# Patient Record
Sex: FEMALE | Race: WHITE | NOT HISPANIC OR LATINO | Employment: FULL TIME | URBAN - NONMETROPOLITAN AREA
[De-identification: names, ages, dates, MRNs, and addresses within clinical notes are randomized per-mention and may not be internally consistent; named-entity substitution may affect disease eponyms.]

---

## 2017-07-07 ENCOUNTER — HISTORY (OUTPATIENT)
Dept: FAMILY MEDICINE | Facility: OTHER | Age: 42
End: 2017-07-07

## 2017-07-07 ENCOUNTER — OFFICE VISIT - GICH (OUTPATIENT)
Dept: FAMILY MEDICINE | Facility: OTHER | Age: 42
End: 2017-07-07

## 2017-07-07 DIAGNOSIS — Z83.49 FAMILY HISTORY OF OTHER ENDOCRINE, NUTRITIONAL AND METABOLIC DISEASES (CODE): ICD-10-CM

## 2017-07-07 DIAGNOSIS — Z00.00 ENCOUNTER FOR GENERAL ADULT MEDICAL EXAMINATION WITHOUT ABNORMAL FINDINGS: ICD-10-CM

## 2017-07-07 LAB — TSH - HISTORICAL: 3.61 UIU/ML (ref 0.34–5.6)

## 2017-07-13 LAB — HPV RESULTS - HISTORICAL: NEGATIVE

## 2017-12-28 NOTE — PROGRESS NOTES
Patient Information     Patient Name MRN Sex Mariama Holbrook [id number] Female 1975      Progress Notes by Kat Price MD at 2017  7:45 AM     Author:  Kat Price MD Service:  (none) Author Type:  Physician     Filed:  2017  8:51 AM Encounter Date:  2017 Status:  Signed     :  Kat Price MD (Physician)            SUBJECTIVE:    Mariama López is a 42 y.o. female who presehts for her annual exam.    HPI: Mariama López is a 42 y.o. female presents for her annual exam.  It has been nearly 8 years since her last checkup.  Would like thyroid testing due to family history.      Last pap:   Immunizations:  Tetanus is due  Mammogram at age 47  Colon cancer screening at age 50    Last Lipids:  Chol: No results found in past 5 years    .  .    PROBLEM LIST:  Patient Active Problem List     Diagnosis  Code     URINARY RETENTION R33.9     IRREGULAR MENSES N92.6     PAST MEDICAL HISTORY:  Past Medical History:     Diagnosis  Date     Hx of pregnancy     G6, , pregnancy complicated by PUPPS infection      SURGICAL HISTORY:  Past Surgical History:      Procedure  Laterality Date     DILATION AND CURETTAGE      D\T\C for miscarriage         SOCIAL HISTORY:  Social History     Social History        Marital status:       Spouse name: Maverick     Number of children:  4     Years of education:  12+     Occupational History        [employer]     Social History Main Topics       Smoking status: Never Smoker     Smokeless tobacco: Not on file     Alcohol use No     Drug use: Not on file     Sexual activity: Not on file     Other Topics  Concern     Not on file      Social History Narrative     No tobacco or alcohol     Darshana    Daughter Asya    Son Zackary    Father Matt    Mother Carmen    Daughter Delores    Son Chan date of birth              FAMILY HISTORY:  Family History       Problem    "Relation Age of Onset     No Known Problems  Father      Other  Mother      fibroids        Thyroid Disease  Mother      Hypertension  Mother      Cancer  Other      Great Uncle Lung cancer        CURRENT MEDICATIONS:   No current outpatient prescriptions on file.     No current facility-administered medications for this visit.      Medications have been reviewed by me and are current to the best of my knowledge and ability.    ALLERGIES:  Review of patient's allergies indicates no known allergies.     REVIEW OF SYSTEMS:  General: denies any general problems. No regular exercise routine  Eyes: contact lenses  Ears/Nose/Throat: denies problems, last dentist visit was every 6 months    Respiratory: denies problems  Cardiovascular: denies problems  Gastrointestinal: Episodes of heartburn, started 2 years ago when her  was laid off.  Genitourinary: periods variable in heaviness, a little crampy, still each month    Musculoskeletal: denies problems  Skin: denies problems  Neurologic: denies problems  Psychiatric: sleep not as good, both falling and staying asleep, early awakening    Endocrine: denies problems  Heme/Lymphatic: denies problems  Allergic/Immunologic: denies problems    PHQ Depression Screening 7/7/2017   Date of PHQ exam (doc flow) 7/7/2017   1. Lack of interest/pleasure 0 - Not at all   2. Feeling down/depressed 0 - Not at all   PHQ-2 TOTAL SCORE 0      OBJECTIVE:  /70  Pulse 72  Ht 1.753 m (5' 9\")  Wt 83.4 kg (183 lb 12.8 oz)  LMP 06/22/2017  BMI 27.14 kg/m2  EXAM:   General Appearance: Pleasant, alert, appropriate appearance for age. No acute distress  Head Exam: Normal. Normocephalic, atraumatic.  Eye Exam:  Normal external eye, conjunctiva, lids, cornea. RANDI.  Ear Exam: Normal TM's bilaterally. Normal auditory canals and external ears. Non-tender.  Nose Exam: Normal external nose, mucus membranes, and septum.  OroPharynx Exam:  Dental hygiene adequate. Normal buccal mucose. Normal " pharynx.  Neck Exam:  Supple, no masses or nodes.  Thyroid Exam: No nodules or enlargement.  Chest/Respiratory Exam: Normal chest wall and respirations. Clear to auscultation.  Breast Exam: No dimpling, nipple retraction or discharge. No masses or nodes.  Cardiovascular Exam: Regular rate and rhythm. S1, S2, no murmur, click, gallop, or rubs.  Gastrointestinal Exam: Soft, non-tender, no masses or organomegaly  Genitourinary Exam Female: External genitalia, vulva and vagina appear normal. Bimanual exam reveals normal uterus and adnexa, nontender urethra and bladder.  Pap and HPV testing done   Lymphatic Exam: Non-palpable nodes in neck, clavicular, axillary, or inguinal regions.  Musculoskeletal Exam: Back is straight and non-tender, full ROM of upper and lower extremities.  Foot Exam: Left and right foot: good pedal pulses, no lesions, nail hygiene good.  Skin: no rash or abnormalities  Neurologic Exam: Nonfocal, symmetric DTRs, normal gross motor, tone coordination and no tremor.  Psychiatric Exam: Alert and oriented - appropriate affect.    No results found for this visit on 07/07/17.    ASSESSMENT/PLAN    ICD-10-CM    1. Physical exam, annual Z00.00 GYN THIN PREP PAP SCREEN IMAGED   2. Family history of hypothyroidism Z83.49 TSH     Ms. Carson There is no height or weight on file to calculate BMI. This is out of the normal range for a 40 y.o. Normal range for ages 18+ is between 18.5 and 24.9. To lose weight we reviewed risks and benefits of appropriate options such as diet, exercise, and medications. Patient's strategy will be  self-directed nutrition plan and self-directed exercise program  BP Readings from Last 1 Encounters:12/15/09 : 118/80  Ms. Carson blood pressure is within the normal range for adults. Per JNC-8 guidelines normal adult blood pressure is < 120/80, pre-hypertensive is between 120/80 and 139/89, and hypertension is 140/90 or greater.    1. TSH testing to be obtained today.  2.  Pessary and HPV testing done  3. Tetanus update offered, declined today  4. Discussed mammography, will start at age 47.    Kat Price MD

## 2017-12-28 NOTE — ADDENDUM NOTE
Patient Information     Patient Name MRN Mariama Lee [id number] Female 1975      Addendum Note by Rose Forbes at 2017 12:16 PM     Author:  Rose Forbes Service:  (none) Author Type:  (none)     Filed:  2017 12:16 PM Encounter Date:  2017 Status:  Signed     :  Rose Forbes       Addended by: ROSE FORBES on: 2017 12:16 PM        Modules accepted: Orders

## 2017-12-30 NOTE — NURSING NOTE
Patient Information     Patient Name MRN Mariama Lee [id number] Female 1975      Nursing Note by Elsa Momin at 2017  7:45 AM     Author:  Elsa Momin Service:  (none) Author Type:  (none)     Filed:  2017  8:14 AM Encounter Date:  2017 Status:  Signed     :  Elsa Momin            Patient here for yearly physical. Has family history of thyroid issues, would like labs checked.  Elsa Momin LPN..............................2017  7:58 AM

## 2018-01-27 VITALS
WEIGHT: 183.8 LBS | HEART RATE: 72 BPM | DIASTOLIC BLOOD PRESSURE: 70 MMHG | BODY MASS INDEX: 27.22 KG/M2 | SYSTOLIC BLOOD PRESSURE: 110 MMHG | HEIGHT: 69 IN

## 2018-07-24 NOTE — PROGRESS NOTES
Patient Information     Patient Name  Mariama López MRN  [id number] Sex  Female   1975      Letter by Kat Crowder MD at      Author:  Kat Crowder MD Service:  (none) Author Type:  (none)    Filed:   Encounter Date:  2017 Status:  (Other)           Mariama López  [address] [facility] [address]          2017    Dear Ms. López:    Your pap smear and HPV results are normal.  Your next pap smear is due in 5 years.    Sincerely,  Kat Price MD [facility] 2017 7:50 AM

## 2019-03-31 ENCOUNTER — OFFICE VISIT (OUTPATIENT)
Dept: FAMILY MEDICINE | Facility: OTHER | Age: 44
End: 2019-03-31
Attending: PHYSICIAN ASSISTANT
Payer: COMMERCIAL

## 2019-03-31 VITALS
TEMPERATURE: 101.4 F | WEIGHT: 179.7 LBS | SYSTOLIC BLOOD PRESSURE: 124 MMHG | DIASTOLIC BLOOD PRESSURE: 84 MMHG | BODY MASS INDEX: 26.62 KG/M2 | RESPIRATION RATE: 16 BRPM | HEIGHT: 69 IN | HEART RATE: 82 BPM

## 2019-03-31 DIAGNOSIS — J02.9 SORE THROAT: ICD-10-CM

## 2019-03-31 DIAGNOSIS — J02.0 STREPTOCOCCAL SORE THROAT: Primary | ICD-10-CM

## 2019-03-31 LAB
DEPRECATED S PYO AG THROAT QL EIA: ABNORMAL
SPECIMEN SOURCE: ABNORMAL

## 2019-03-31 PROCEDURE — 87880 STREP A ASSAY W/OPTIC: CPT | Performed by: PHYSICIAN ASSISTANT

## 2019-03-31 PROCEDURE — 99214 OFFICE O/P EST MOD 30 MIN: CPT | Performed by: NURSE PRACTITIONER

## 2019-03-31 RX ORDER — AMOXICILLIN 500 MG/1
500 TABLET, FILM COATED ORAL 2 TIMES DAILY
Qty: 20 TABLET | Refills: 0 | Status: SHIPPED | OUTPATIENT
Start: 2019-03-31 | End: 2019-04-10

## 2019-03-31 ASSESSMENT — PAIN SCALES - GENERAL: PAINLEVEL: NO PAIN (0)

## 2019-03-31 ASSESSMENT — ENCOUNTER SYMPTOMS
FEVER: 1
SORE THROAT: 1
COUGH: 0
ABDOMINAL PAIN: 0
MUSCULOSKELETAL NEGATIVE: 1
NEUROLOGICAL NEGATIVE: 1
PSYCHIATRIC NEGATIVE: 1

## 2019-03-31 ASSESSMENT — MIFFLIN-ST. JEOR: SCORE: 1539.49

## 2019-03-31 NOTE — NURSING NOTE
Chief Complaint   Patient presents with     Throat Pain   Patient presents to the clinic today reporting that last night she started having a sore throat, fever and body aches.Patient reports she was sick two week ago with the flu.    Medication Reconciliation: completed   Becca Camara LPN  3/31/2019 4:37 PM

## 2019-03-31 NOTE — PROGRESS NOTES
"SUBJECTIVE:   Mariama López is a 44 year old female who presents to clinic today for the following health issues:    HPI  Presents with a sore throat and fever since last night. Has been around many ill children at work. No cough or runny nose. Taking ibuprofen for symptoms.     There are no active problems to display for this patient.    Past Medical History:   Diagnosis Date     Personal history of other medical treatment (CODE)     G6, , pregnancy complicated by PUPPS infection      Past Surgical History:   Procedure Laterality Date     DILATION AND CURETTAGE      02/06,D&C for miscarriage     Family History   Problem Relation Age of Onset     Other - See Comments Father         No Known Problems     Other - See Comments Mother         fibroids     Thyroid Disease Mother         Thyroid Disease     Hypertension Mother         Hypertension     Cancer Other         Cancer,Great Uncle Lung cancer     Social History     Tobacco Use     Smoking status: Never Smoker     Smokeless tobacco: Never Used   Substance Use Topics     Alcohol use: No     Social History     Social History Narrative    No tobacco or alcohol   Darshana  Daughter Asya  Son Zackary  Father Matt  Mother Carmen  Daughter Delores  Son Chan date of birth 04/07     No current outpatient medications on file.     No Known Allergies    Review of Systems   Constitutional: Positive for fever.   HENT: Positive for sore throat. Negative for congestion and ear discharge.    Respiratory: Negative for cough.    Gastrointestinal: Negative for abdominal pain.   Musculoskeletal: Negative.    Neurological: Negative.    Psychiatric/Behavioral: Negative.         OBJECTIVE:     /84 (BP Location: Left arm, Patient Position: Sitting, Cuff Size: Adult Regular)   Pulse 82   Temp 101.4  F (38.6  C) (Tympanic)   Resp 16   Ht 1.753 m (5' 9\")   Wt 81.5 kg (179 lb 11.2 oz)   BMI 26.54 kg/m    Body mass index is 26.54 kg/m .  Physical Exam "   Constitutional: She is oriented to person, place, and time. She appears well-developed and well-nourished.   HENT:   Head: Normocephalic and atraumatic.   Right Ear: Tympanic membrane and external ear normal.   Left Ear: Tympanic membrane and external ear normal.   Nose: Nose normal.   Mouth/Throat: Uvula is midline and mucous membranes are normal. No trismus in the jaw. No uvula swelling. Oropharyngeal exudate, posterior oropharyngeal edema and posterior oropharyngeal erythema present. No tonsillar abscesses.   Eyes: Conjunctivae are normal. No scleral icterus.   Neck: Normal range of motion. Neck supple.   Cardiovascular: Normal rate, regular rhythm and normal heart sounds.   Pulmonary/Chest: Effort normal and breath sounds normal.   Musculoskeletal: Normal range of motion.   Neurological: She is alert and oriented to person, place, and time.   Skin: Skin is warm and dry.   Psychiatric: She has a normal mood and affect. Her behavior is normal.   Nursing note and vitals reviewed.      Diagnostic Test Results:  Results for orders placed or performed in visit on 03/31/19 (from the past 24 hour(s))   Strep, Rapid Screen   Result Value Ref Range    Specimen Description Throat     Rapid Strep A Screen (A)      POSITIVE: Group A Streptococcal antigen detected by immunoassay.       ASSESSMENT/PLAN:       ICD-10-CM    1. Streptococcal sore throat J02.0 amoxicillin (AMOXIL) 500 MG tablet   2. Sore throat J02.9 Strep, Rapid Screen     Positive rapid strep. No trismus or airway compromise.   Advised rest and fluids. Eat soft foods.   Continue with analgesics.   F/u in 48-72 hours if not improving, sooner if sx worsen.   Given Epic educational materials.     DAVIN Sharp, NP-C  [facility]  4:57 PM March 31, 2019

## 2019-03-31 NOTE — PATIENT INSTRUCTIONS
Patient Education     Pharyngitis: Strep (Confirmed)    You have had a positive test for strep throat. Strep throat is a contagious illness. It is spread by coughing, kissing or by touching others after touching your mouth or nose. Symptoms include throat pain that is worse with swallowing, aching all over, headache, and fever. It is treated with antibiotic medicine. This should help you start to feel better in 1 to 2 days.  Home care    Rest at home. Drink plenty of fluids to you won't get dehydrated.    No work or school for the first 2 days of taking the antibiotics. After this time, you will not be contagious. You can then return to school or work if you are feeling better.     Take antibiotic medicine for the full 10 days, even if you feel better. This is very important to ensure the infection is treated. It is also important to prevent medicine-resistant germs from developing. If you were given an antibiotic shot, you don't need any more antibiotics.    You may use acetaminophen or ibuprofen to control pain or fever, unless another medicine was prescribed for this. Talk with your healthcare provider before taking these medicines if you have chronic liver or kidney disease. Also talk with your healthcare provider if you have had a stomach ulcer or GI bleeding.    Throat lozenges or sprays help reduce pain. Gargling with warm saltwater will also reduce throat pain. Dissolve 1/2 teaspoon of salt in 1 glass of warm water. This may be useful just before meals.     Soft foods are OK. Don't eat salty or spicy foods.  Follow-up care  Follow up with your healthcare provider or our staff if you don't get better over the next week.  When to seek medical advice  Call your healthcare provider right away if any of these occur:    Fever of 100.4 F (38 C) or higher, or as directed by your healthcare provider    New or worsening ear pain, sinus pain, or headache    Painful lumps in the back of neck    Stiff neck    Lymph  nodes getting larger or becoming soft in the middle    You can't swallow liquids or you can't open your mouth wide because of throat pain    Signs of dehydration. These include very dark urine or no urine, sunken eyes, and dizziness.    Trouble breathing or noisy breathing    Muffled voice    Rash  Prevention  Here are steps you can take to help prevent an infection:    Keep good hand washing habits.    Don t have close contact with people who have sore throats, colds, or other upper respiratory infections.    Don t smoke, and stay away from secondhand smoke.  Date Last Reviewed: 11/1/2017 2000-2018 [employer]. [address] [place] [address], [place] [address]. All rights reserved. This information is not intended as a substitute for professional medical care. Always follow your healthcare professional's instructions.

## 2021-10-09 ENCOUNTER — HEALTH MAINTENANCE LETTER (OUTPATIENT)
Age: 46
End: 2021-10-09

## 2021-11-30 ENCOUNTER — OFFICE VISIT (OUTPATIENT)
Dept: FAMILY MEDICINE | Facility: OTHER | Age: 46
End: 2021-11-30
Attending: NURSE PRACTITIONER
Payer: COMMERCIAL

## 2021-11-30 VITALS
BODY MASS INDEX: 27.12 KG/M2 | HEART RATE: 93 BPM | OXYGEN SATURATION: 97 % | WEIGHT: 183.1 LBS | HEIGHT: 69 IN | DIASTOLIC BLOOD PRESSURE: 74 MMHG | SYSTOLIC BLOOD PRESSURE: 124 MMHG | TEMPERATURE: 98.6 F | RESPIRATION RATE: 18 BRPM

## 2021-11-30 DIAGNOSIS — J02.9 SORETHROAT: Primary | ICD-10-CM

## 2021-11-30 LAB — GROUP A STREP BY PCR: NOT DETECTED

## 2021-11-30 PROCEDURE — U0003 INFECTIOUS AGENT DETECTION BY NUCLEIC ACID (DNA OR RNA); SEVERE ACUTE RESPIRATORY SYNDROME CORONAVIRUS 2 (SARS-COV-2) (CORONAVIRUS DISEASE [COVID-19]), AMPLIFIED PROBE TECHNIQUE, MAKING USE OF HIGH THROUGHPUT TECHNOLOGIES AS DESCRIBED BY CMS-2020-01-R: HCPCS | Mod: ZL | Performed by: NURSE PRACTITIONER

## 2021-11-30 PROCEDURE — C9803 HOPD COVID-19 SPEC COLLECT: HCPCS | Performed by: NURSE PRACTITIONER

## 2021-11-30 PROCEDURE — 99213 OFFICE O/P EST LOW 20 MIN: CPT | Performed by: NURSE PRACTITIONER

## 2021-11-30 PROCEDURE — 87651 STREP A DNA AMP PROBE: CPT | Mod: ZL | Performed by: NURSE PRACTITIONER

## 2021-11-30 ASSESSMENT — PAIN SCALES - GENERAL: PAINLEVEL: EXTREME PAIN (8)

## 2021-11-30 ASSESSMENT — MIFFLIN-ST. JEOR: SCORE: 1544.92

## 2021-11-30 NOTE — PATIENT INSTRUCTIONS

## 2021-11-30 NOTE — NURSING NOTE
"Chief Complaint   Patient presents with     Pharyngitis     She got a sore throat later in the afternoon yesterday. She did have a temp of 99 at home.     Initial There were no vitals taken for this visit. Estimated body mass index is 26.54 kg/m  as calculated from the following:    Height as of 3/31/19: 1.753 m (5' 9\").    Weight as of 3/31/19: 81.5 kg (179 lb 11.2 oz).     FOOD SECURITY SCREENING QUESTIONS  Hunger Vital Signs:  Within the past 12 months we worried whether our food would run out before we got money to buy more. Never  Within the past 12 months the food we bought just didn't last and we didn't have money to get more. Never      Medication Reconciliation: Complete      Richard Washington LPN   "

## 2021-11-30 NOTE — LETTER
November 30, 2021        Mariama López  [address] [facility] [address]    To Whom it May Concern:    Mariama López was seen in our office on 11/30/2021 and was given the following instructions:  Patient may return to work once she receives her COVID result as long as this is negative and she is fever free for 24 hours without the use of fever reducing medication.    Sincerely,          Clotilde Jennings NP ..................11/30/2021 12:06 PM

## 2021-11-30 NOTE — PROGRESS NOTES
ASSESSMENT/PLAN:  1. Sorethroat    - Group A Streptococcus PCR Throat Swab  - Symptomatic COVID-19 Virus (Coronavirus) by PCR Nose      Strep result was negative.     COVID result pending. Instructed the patient to remain in quarantine until she receives her COVID result, as long as this is negative.     Discussed with patient that symptoms and exam are consistent with viral illness.  Discussed that symptomatic treatment is appropriate but not with antibiotics.      Symptomatic treatment - Encouraged fluids, salt water gargles, honey, lozenges, etc     May use over-the-counter Tylenol or ibuprofen PRN    Discussed warning signs/symptoms indicative of need to f/u    Follow up if symptoms persist or worsen or concerns      I explained my diagnostic considerations and recommendations to the patient, who voiced understanding and agreement with the treatment plan. All questions were answered. We discussed potential side effects of any prescribed or recommended therapies, as well as expectations for response to treatments.        HPI:    Mariama López is a 46 year old female  who presents [facility] today for a sore throat and temperature of 99 F. Symptoms started yesterday. Noticed white spots to posterior throat. Denies body aches or fatigue. Rating pain in throat 9/10 with swallowing. Reports taking Ibuprofen. No known sick contacts. Denies a cough or other upper respiratory symptoms.     Past Medical History:   Diagnosis Date     Personal history of other medical treatment (CODE)     G6, , pregnancy complicated by PUPPS infection     Past Surgical History:   Procedure Laterality Date     DILATION AND CURETTAGE      02/06,D&C for miscarriage     Social History     Tobacco Use     Smoking status: Never Smoker     Smokeless tobacco: Never Used   Substance Use Topics     Alcohol use: No     No current outpatient medications on file.     No Known Allergies      Past medical history, past surgical history,  "current medications and allergies reviewed and accurate to the best of my knowledge.        ROS:  Refer to HPI    /74   Pulse 93   Temp 98.6  F (37  C) (Tympanic)   Resp 18   Ht 1.753 m (5' 9\")   Wt 83.1 kg (183 lb 1.6 oz)   LMP 11/30/2021   SpO2 97%   BMI 27.04 kg/m      EXAM:  General Appearance: Well appearing female, appropriate appearance for age. No acute distress  Ears: Left TM intact, translucent with bony landmarks appreciated, no erythema, no effusion, no bulging, no purulence.  Right TM intact, translucent with bony landmarks appreciated, no erythema, no effusion, no bulging, no purulence.  Left auditory canal clear.  Right auditory canal clear.  Normal external ears, non tender.  Orophayrnx: moist mucous membranes, posterior pharynx with erythema, tonsils without hypertrophy, erythema present, white exudates present to bilateral tonsils, no petechiae, no post nasal drip seen, no trismus, voice hoarse.    Neck: supple with adenopathy of right anterior cervical chain.   Respiratory: normal chest wall and respirations.  Normal effort.  Clear to auscultation bilaterally, no wheezing, crackles or rhonchi.  No increased work of breathing.  No cough appreciated.  Cardiac: RRR with no murmurs  Psychological: normal affect, alert, oriented, and pleasant.       Labs:  Results for orders placed or performed in visit on 11/30/21   Group A Streptococcus PCR Throat Swab     Status: Normal    Specimen: Throat; Swab   Result Value Ref Range    Group A strep by PCR Not Detected Not Detected    Narrative    The Xpert Xpress Strep A test, performed on the [employer], is a rapid, qualitative in vitro diagnostic test for the detection of Streptococcus pyogenes (Group A ß-hemolytic Streptococcus, Strep A) in throat swab specimens from patients with signs and symptoms of pharyngitis. The Xpert Xpress Strep A test can be used as an aid in the diagnosis of Group A Streptococcal pharyngitis. The " assay is not intended to monitor treatment for Group A Streptococcus infections. The Xpert Xpress Strep A test utilizes an automated real-time polymerase chain reaction (PCR) to detect Streptococcus pyogenes DNA.

## 2021-12-01 ENCOUNTER — TELEPHONE (OUTPATIENT)
Dept: FAMILY MEDICINE | Facility: OTHER | Age: 46
End: 2021-12-01
Payer: COMMERCIAL

## 2021-12-01 LAB — SARS-COV-2 RNA RESP QL NAA+PROBE: NEGATIVE

## 2022-01-29 ENCOUNTER — HEALTH MAINTENANCE LETTER (OUTPATIENT)
Age: 47
End: 2022-01-29

## 2022-09-17 ENCOUNTER — HEALTH MAINTENANCE LETTER (OUTPATIENT)
Age: 47
End: 2022-09-17

## 2022-09-22 ENCOUNTER — APPOINTMENT (OUTPATIENT)
Dept: LAB | Facility: OTHER | Age: 47
End: 2022-09-22
Attending: FAMILY MEDICINE

## 2022-09-22 ENCOUNTER — APPOINTMENT (OUTPATIENT)
Dept: FAMILY MEDICINE | Facility: OTHER | Age: 47
End: 2022-09-22
Attending: CHIROPRACTOR

## 2022-09-22 PROCEDURE — 93000 ELECTROCARDIOGRAM COMPLETE: CPT | Performed by: INTERNAL MEDICINE

## 2022-09-22 PROCEDURE — 99499 UNLISTED E&M SERVICE: CPT | Performed by: CHIROPRACTOR

## 2023-01-28 ENCOUNTER — HEALTH MAINTENANCE LETTER (OUTPATIENT)
Age: 48
End: 2023-01-28

## 2023-05-06 ENCOUNTER — HEALTH MAINTENANCE LETTER (OUTPATIENT)
Age: 48
End: 2023-05-06

## 2024-02-25 ENCOUNTER — HEALTH MAINTENANCE LETTER (OUTPATIENT)
Age: 49
End: 2024-02-25

## 2025-03-09 ENCOUNTER — HEALTH MAINTENANCE LETTER (OUTPATIENT)
Age: 50
End: 2025-03-09

## 2025-05-17 ENCOUNTER — HEALTH MAINTENANCE LETTER (OUTPATIENT)
Age: 50
End: 2025-05-17